# Patient Record
Sex: MALE | Race: WHITE | NOT HISPANIC OR LATINO | Employment: FULL TIME | URBAN - METROPOLITAN AREA
[De-identification: names, ages, dates, MRNs, and addresses within clinical notes are randomized per-mention and may not be internally consistent; named-entity substitution may affect disease eponyms.]

---

## 2017-11-16 ENCOUNTER — ALLSCRIPTS OFFICE VISIT (OUTPATIENT)
Dept: OTHER | Facility: OTHER | Age: 22
End: 2017-11-16

## 2018-01-12 NOTE — PROGRESS NOTES
Assessment    1  Encounter for preventive health examination (V70 0) (Z00 00)    Plan  Health Maintenance    · Begin a limited exercise program ; Status:Complete;   Done: [id number]   · Decreasing the stress in your life may help your condition improve ; Status:Complete;    Done: 66DUG0138   · Regular aerobic exercise can help reduce stress ; Status:Complete;   Done: 57BGG6975   · There are many ways to reduce your risk of catching or spreading a sexually transmitted  Infection ; Status:Complete;   Done: 38FIR0222   · Use a sun block product with an SPF of 15 or more ; Status:Complete;   Done:  46CGP6848    Discussion/Summary  Impression: health maintenance visit, healthy adult male  Currently, he eats a healthy diet and has an adequate exercise regimen  Prostate cancer screening: the risks and benefits of prostate cancer screening were discussed  Testicular cancer screening: the risks and benefits of testicular cancer screening were discussed, self testicular exam technique was taught and testicular cancer screening is current  Colorectal cancer screening: the risks and benefits of colorectal cancer screening were discussed and colorectal cancer screening is not indicated  The risks and benefits of immunizations were discussed, immunizations are needed and patient declines immunizations  Advice and education were given regarding nutrition, aerobic exercise, calcium supplements and vitamin D supplements  Health maintenance: Patient appears in good health  Form for the [employer] was filled out  Immunization needed include HPV, Menactra: Patient declined  Possible side effects of new medications were reviewed with the patient/guardian today  The treatment plan was reviewed with the patient/guardian  The patient/guardian understands and agrees with the treatment plan      Chief Complaint  Pt here for routine physical  Pt  has form to be completed  No concerns noted        History of Present Illness  HM, Adult Male: The patient is being seen for a health maintenance evaluation  The last health maintenance visit was 1 year(s) ago  Social History: Household members include parents  He is single  Work status: working part-time and occupation: [employer] work   The patient has never smoked cigarettes  He reports never drinking alcohol  He has never used illicit drugs  General Health: The patient's health since the last visit is described as good  He has regular dental visits  He denies vision problems  He denies hearing loss  Immunizations status: not up to date  Lifestyle:  He consumes a diverse and healthy diet  He does not have any weight concerns  He exercises regularly  He does not use tobacco  He denies alcohol use  He denies drug use  Reproductive health:  the patient is not sexually active  He denies erectile dysfunction  Screening:   HPI: Patient is here for health maintenance visit, denied any acute complaint  Review of Systems    Constitutional: No fever or chills, feels well, no tiredness, no recent weight gain or weight loss  Eyes: No complaints of eye pain, no red eyes, no discharge from eyes, no itchy eyes  ENT: no complaints of earache, no hearing loss, no nosebleeds, no nasal discharge, no sore throat, no hoarseness  Cardiovascular: No complaints of slow heart rate, no fast heart rate, no chest pain, no palpitations, no leg claudication, no lower extremity  Respiratory: No complaints of shortness of breath, no wheezing, no cough, no SOB on exertion, no orthopnea or PND  Gastrointestinal: No complaints of abdominal pain, no constipation, no nausea or vomiting, no diarrhea or bloody stools  Genitourinary: No complaints of dysuria, no incontinence, no hesitancy, no nocturia, no genital lesion, no testicular pain  Musculoskeletal: No complaints of arthralgia, no myalgias, no joint swelling or stiffness, no limb pain or swelling     Integumentary: No complaints of skin rash or skin lesions, no itching, no skin wound, no dry skin  Neurological: No compliants of headache, no confusion, no convulsions, no numbness or tingling, no dizziness or fainting, no limb weakness, no difficulty walking  Psychiatric: Is not suicidal, no sleep disturbances, no anxiety or depression, no change in personality, no emotional problems  Endocrine: No complaints of proptosis, no hot flashes, no muscle weakness, no erectile dysfunction, no deepening of the voice, no feelings of weakness  Hematologic/Lymphatic: No complaints of swollen glands, no swollen glands in the neck, does not bleed easily, no easy bruising  Active Problems    1  Nodule of flexor tendon sheath (727 89) (M67 90)   2  Skin granuloma (686 1) (L92 9)    Past Medical History    · History of Abrasion of sclera (918 2) (X42 6S9G)   · History of Acute nonsuppurative otitis media, unspecified laterality (381 00) (H65 199)   · History of Acute otitis media, unspecified laterality   · History of Earache On Both Sides   · History of allergic rhinitis (V12 69) (Z87 09)   · History of pharyngitis (V12 69) (Z87 09)   · History of wheezing (V12 69) (Z87 898)    Surgical History    · Denied: History Of Prior Surgery    Family History  Family History    · Family history of Family Health Status Siblings ___ Living All In Good Health    Social History    · Denied: History of Alcohol Use (History)   · Denied: History of Drug Use   · Never A Smoker    Allergies    1   No Known Drug Allergies    Vitals   Recorded: 50DBB2962 01:38PM   Heart Rate 64   Systolic 894, LUE, Sitting   Diastolic 60, LUE, Sitting   Height 6 ft 2 5 in   Weight 163 lb    BMI Calculated 20 65   BSA Calculated 2     Signatures   Electronically signed by : Qian Allen MD; Nov 16 2017  2:03PM EST                       (Author)

## 2018-01-13 VITALS
SYSTOLIC BLOOD PRESSURE: 132 MMHG | BODY MASS INDEX: 20.27 KG/M2 | HEART RATE: 64 BPM | WEIGHT: 163 LBS | HEIGHT: 75 IN | DIASTOLIC BLOOD PRESSURE: 60 MMHG

## 2018-01-13 NOTE — PROGRESS NOTES
Assessment    1  Abrasion of sclera (918 2) (Q33 4A3W)    Discussion/Summary    1  Abrasion of the sclera-this is not seen on physical exam or it has resolved  There is some residual redness of the sclera however and I suspect there may just be some residual conjunctivitis  He is to continue sulfacetamide drops for the next 5-7 days and followup if symptoms are not completely better  Chief Complaint  1 day follow up to right eye redness and irritation  kck      History of Present Illness  HPI: This is a 72-year-old gentleman that presents to the office for one day followup of redness in his left thigh  He has had some itching which started several days ago  He has not had much discharge from the eye  He has not had any blurred vision or change in vision  There is no pain in the eye  He denies any foreign body sensation in the eye and he has not been working with any milk shavings  He was evaluated yesterday and started on sulfacetamide drops  He states that it does feel slightly better today  Review of Systems    Constitutional: no fever  Active Problems    1  Abrasion of sclera (918 2) (S05 8X9A)   2  Acute nonsuppurative otitis media, unspecified laterality (381 00) (H65 199)   3  Acute otitis media, unspecified laterality   4  Nodule of flexor tendon sheath (727 89) (M67 90)   5  Pharyngitis (462) (J02 9)   6  Skin granuloma (686 1) (L92 9)    Past Medical History    1  History of Earache On Both Sides   2  History of allergic rhinitis (V12 69) (Z87 09)   3  History of wheezing (V12 69) (Z87 898)  Active Problems And Past Medical History Reviewed: The active problems and past medical history were reviewed and updated today  Family History  Family History    1  Family history of Family Health Status Siblings ___ Living All In [facility] History    · Denied: History of Alcohol Use (History)   · Denied: History of Drug Use   · Never A Smoker    Surgical History    1   Denied: History Of Prior Surgery    Current Meds   1  Sulfacetamide Sodium 10 % Ophthalmic Solution; INSTILL 2 DROP 3 times daily; Therapy: 73Bvx9853 to (Last Rx:66Fbb5024)  Requested for: 77Qti9649 Ordered    The medication list was reviewed and updated today  Allergies    1  No Known Drug Allergies    Vitals   Recorded: 01CRY9916 80:11IS   Systolic 437, LUE, Sitting   Diastolic 60, LUE, Sitting   Heart Rate 60   Height 6 ft 1 in   Weight 164 lb    BMI Calculated 21 64   BSA Calculated 1 97     Physical Exam    Constitutional   General appearance: No acute distress, well appearing and well nourished  Eyes   Conjunctiva and lids: No swelling, erythema, or discharge  Pupils and irises: Abnormal   Floor seen stain was used to examine the eye and no abrasions or ulcerations were noted  No foreign bodies visible  Pulmonary   Respiratory effort: No increased work of breathing or signs of respiratory distress  Psychiatric   Orientation to person, place and time: Normal     Mood and affect: Normal          Signatures   Electronically signed by : Lawrence Jaimes, [facility]; Sep  2 2016  2:15PM EST                       (Author)    Electronically signed by :  MANJINDER Ford ; Oct  5 2016 11:49AM EST

## 2022-03-22 ENCOUNTER — OFFICE VISIT (OUTPATIENT)
Dept: URGENT CARE | Facility: MEDICAL CENTER | Age: 27
End: 2022-03-22
Payer: OTHER GOVERNMENT

## 2022-03-22 VITALS
WEIGHT: 175 LBS | OXYGEN SATURATION: 99 % | RESPIRATION RATE: 16 BRPM | HEART RATE: 79 BPM | HEIGHT: 75 IN | TEMPERATURE: 98 F | BODY MASS INDEX: 21.76 KG/M2

## 2022-03-22 DIAGNOSIS — J06.9 VIRAL URI: ICD-10-CM

## 2022-03-22 DIAGNOSIS — R05.1 ACUTE COUGH: Primary | ICD-10-CM

## 2022-03-22 PROCEDURE — 99213 OFFICE O/P EST LOW 20 MIN: CPT | Performed by: EMERGENCY MEDICINE

## 2022-03-22 PROCEDURE — 87636 SARSCOV2 & INF A&B AMP PRB: CPT | Performed by: EMERGENCY MEDICINE

## 2022-03-22 RX ORDER — FLUTICASONE PROPIONATE 50 MCG
1 SPRAY, SUSPENSION (ML) NASAL DAILY
Qty: 9.9 ML | Refills: 0 | Status: SHIPPED | OUTPATIENT
Start: 2022-03-22

## 2022-03-22 NOTE — PROGRESS NOTES
[facility]        NAME: Bryanna Wilks is a 27 y o  male  : 1995    MRN: [id number]  DATE: 2022  TIME: 2:24 PM    Assessment and Plan   Acute cough [R05 1]  1  Acute cough     2  Viral URI  fluticasone (FLONASE) 50 mcg/act nasal spray         Patient Instructions     Your evaluation suggests that your symptoms are most likely due to a viral illness, which will improve on its own with rest and fluids  COVID/flu tests were sent  Results will be available in [employer] in 24-72 hours  Someone will call you with the results  We recommend you take 600mg ibuprofen every 6 hours or tylenol 650mg every 6 hours as needed for fever  If needed, you can alternate these medications so that you take one medication every 3 hours  For instance, at noon take ibuprofen, then at 3pm take tylenol, then at 6pm take ibuprofen  Over the counter allergy medication like Claritin, Allegra or Zyrtec can help with nasal congestion and post nasal drip  Over the counter steroid nasal sprays like Flonase can help with nasal congestion and post nasal drip as well  Delsym, an over the counter cough medication may be used every 12 hours as needed  Mucinex XR (guafenisen) 600 mg tablets may be used to thin out the mucous to make it easier to cough up  You may take 1-2 tablets twice per day as needed  Salt water gargles with 1 teaspoon of salt dissolved in 6-8 oz of water as needed can help with a sore throat  Please schedule an appointment for follow up with your primary care physician this week  Return to the Emergency Department if you experience worsening cough, fever 100 4 ° F or greater  that is not controlled by Tylenol or Ibuprofen, recurrent vomiting, chest pain, shortness of breath, or any other concerning symptoms  Follow up with PCP in 3-5 days  Proceed to  ER if symptoms worsen      Chief Complaint     Chief Complaint   Patient presents with    Cold Like Symptoms     dry cough, headache, fatigue, runny nose, sore throat, and congestion x 1 week  is flu and covid vaccinated  History of Present Illness       27 y o  male presents today c/o fever, chills, myalgias, and sore throat for 6 days  Other symptoms include cough  Denies change in ability to taste or smell  Denies recent travel  No known exposure to sick contacts  He was vaccinated with the influenza vaccine this year  He did receive the Covid Vaccine  Is not immunocompromised  Is not currently pregnant  Has taken sudafed for his/her symptoms  Review of Systems   Review of Systems   Constitutional: Negative for chills, fatigue and fever  HENT: Positive for congestion, postnasal drip and sore throat  Negative for trouble swallowing  Respiratory: Positive for cough  Negative for chest tightness and shortness of breath  Cardiovascular: Negative for leg swelling  Gastrointestinal: Negative for abdominal pain  Genitourinary: Negative for dysuria  Musculoskeletal: Negative for back pain  Skin: Negative for rash  Allergic/Immunologic: Negative for immunocompromised state  Neurological: Positive for headaches  Negative for dizziness and light-headedness  Psychiatric/Behavioral: Negative for confusion  Current Medications       Current Outpatient Medications:     fluticasone (FLONASE) 50 mcg/act nasal spray, 1 spray into each nostril daily, Disp: 9 9 mL, Rfl: 0    Current Allergies     Allergies as of 03/22/2022    (No Known Allergies)            The following portions of the patient's history were reviewed and updated as appropriate: allergies, current medications, past family history, past medical history, past social history, past surgical history and problem list      History reviewed  No pertinent past medical history  History reviewed  No pertinent surgical history  No family history on file  Medications have been verified          Objective   Pulse 79   Temp 98 °F (36 7 °C)   Resp 16   Ht 6' 3" (1 905 m)   Wt 79 4 kg (175 lb)   SpO2 99%   BMI 21 87 kg/m²        Physical Exam     Physical Exam  Vitals and nursing note reviewed  Constitutional:       Appearance: Normal appearance  He is well-developed  He is not ill-appearing  HENT:      Head: Normocephalic and atraumatic  Right Ear: Tympanic membrane, ear canal and external ear normal       Left Ear: Tympanic membrane, ear canal and external ear normal       Nose: Rhinorrhea present  Mouth/Throat:      Mouth: Mucous membranes are moist       Pharynx: Uvula midline  Comments: Mild, clear post nasal drip  Eyes:      Pupils: Pupils are equal, round, and reactive to light  Cardiovascular:      Rate and Rhythm: Normal rate and regular rhythm  Pulses: Normal pulses  Pulmonary:      Effort: Pulmonary effort is normal       Breath sounds: Normal breath sounds  Abdominal:      General: Bowel sounds are normal       Palpations: Abdomen is soft  Tenderness: There is no abdominal tenderness  Musculoskeletal:         General: Normal range of motion  Cervical back: Normal range of motion  Skin:     General: Skin is warm and dry  Capillary Refill: Capillary refill takes less than 2 seconds  Neurological:      Mental Status: He is alert and oriented to person, place, and time  Comments: Patient moving all extremities equally, no focal neuro deficits noted         Psychiatric:         Mood and Affect: Mood normal          Behavior: Behavior normal

## 2022-03-22 NOTE — PATIENT INSTRUCTIONS
Your evaluation suggests that your symptoms are most likely due to a viral illness, which will improve on its own with rest and fluids  COVID/flu tests were sent  Results will be available in [employer] in 24-72 hours  Someone will call you with the results  We recommend you take 600mg ibuprofen every 6 hours or tylenol 650mg every 6 hours as needed for fever  If needed, you can alternate these medications so that you take one medication every 3 hours  For instance, at noon take ibuprofen, then at 3pm take tylenol, then at 6pm take ibuprofen  Over the counter allergy medication like Claritin, Allegra or Zyrtec can help with nasal congestion and post nasal drip  Over the counter steroid nasal sprays like Flonase can help with nasal congestion and post nasal drip as well  Delsym, an over the counter cough medication may be used every 12 hours as needed  Mucinex XR (guafenisen) 600 mg tablets may be used to thin out the mucous to make it easier to cough up  You may take 1-2 tablets twice per day as needed  Salt water gargles with 1 teaspoon of salt dissolved in 6-8 oz of water as needed can help with a sore throat  Please schedule an appointment for follow up with your primary care physician this week  Return to the Emergency Department if you experience worsening cough, fever 100 4 ° F or greater  that is not controlled by Tylenol or Ibuprofen, recurrent vomiting, chest pain, shortness of breath, or any other concerning symptoms

## 2022-03-23 LAB
FLUAV RNA RESP QL NAA+PROBE: NEGATIVE
FLUBV RNA RESP QL NAA+PROBE: NEGATIVE
SARS-COV-2 RNA RESP QL NAA+PROBE: NEGATIVE